# Patient Record
Sex: FEMALE
[De-identification: names, ages, dates, MRNs, and addresses within clinical notes are randomized per-mention and may not be internally consistent; named-entity substitution may affect disease eponyms.]

---

## 2024-01-05 PROBLEM — Z00.00 ENCOUNTER FOR PREVENTIVE HEALTH EXAMINATION: Status: ACTIVE | Noted: 2024-01-05

## 2024-01-10 ENCOUNTER — APPOINTMENT (OUTPATIENT)
Dept: CARDIOLOGY | Facility: CLINIC | Age: 39
End: 2024-01-10
Payer: SELF-PAY

## 2024-01-10 VITALS
DIASTOLIC BLOOD PRESSURE: 71 MMHG | TEMPERATURE: 99.3 F | HEIGHT: 67 IN | SYSTOLIC BLOOD PRESSURE: 106 MMHG | HEART RATE: 72 BPM | WEIGHT: 192 LBS | BODY MASS INDEX: 30.13 KG/M2 | OXYGEN SATURATION: 99 %

## 2024-01-10 PROCEDURE — 99215 OFFICE O/P EST HI 40 MIN: CPT

## 2024-01-10 PROCEDURE — 93000 ELECTROCARDIOGRAM COMPLETE: CPT

## 2024-01-10 NOTE — PHYSICAL EXAM
[Well Developed] : well developed [Well Nourished] : well nourished [No Acute Distress] : no acute distress [Normal Venous Pressure] : normal venous pressure [Normal S1, S2] : normal S1, S2 [No Murmur] : no murmur [Clear Lung Fields] : clear lung fields [Good Air Entry] : good air entry [No Respiratory Distress] : no respiratory distress  [Soft] : abdomen soft [Non Tender] : non-tender [Normal Gait] : normal gait [No Edema] : no edema [No Rash] : no rash [Moves all extremities] : moves all extremities [No Focal Deficits] : no focal deficits [Alert and Oriented] : alert and oriented

## 2024-01-29 NOTE — DISCUSSION/SUMMARY
[EKG obtained to assist in diagnosis and management of assessed problem(s)] : EKG obtained to assist in diagnosis and management of assessed problem(s) [FreeTextEntry1] : 29M presents to establish care  1. encounter for preventative care -pt states she requires a letter to try IVF again -pt has a child, 2 yo, denies any complications during prev pregnancy and delivery -pt is comfortable appearing, euvolemic on exam -no murmurs -no edema, orthopnea -no hx of syncope vt vf scd -pt states recent normal echo, does not have results -pt able to exercise without issues -pt is low risk for low risk IVF and can proceed without further cardiac testing  f/u 6 months unless requires sooner >45 min spent on complete encounter

## 2024-01-29 NOTE — HISTORY OF PRESENT ILLNESS
[FreeTextEntry1] : 29F presents to establish care Sent in by: found online PMD:  speaks melissa creole  pt told by OBGYN Dr Jackson (?first name, contact info) that she needs cardiac clearance to get pregnant.  pt here with her daughter, 1 year old. pt underwent IVF. pt denies any issues with prev pregnancy, denies preeclampsia, cp sob gest htn or gest dm. pt had a c/s  today pt feeling well overall, denies CP, SOB, at rest or on exertion. Denies palpitations, dizziness, diaphoresis, syncope, LE edema, orthopnea  Exercise: jogging occasionally, no symptoms  Diet: none  Prev cardiac history: none Previous cardiac testing: pt states recent echo, normal per pt.  Recent labs:  EKG: SR 72  Med hx: none Sx hx: c/s 	 OBGYN HX: one daughter. 2yo.  Family hx: no known cardiac hx Social hx:  lives in [place] with family, not working. no tob/etoh/drugs Meds: none Allergies: nkda